# Patient Record
Sex: MALE | Race: BLACK OR AFRICAN AMERICAN | Employment: UNEMPLOYED | URBAN - METROPOLITAN AREA
[De-identification: names, ages, dates, MRNs, and addresses within clinical notes are randomized per-mention and may not be internally consistent; named-entity substitution may affect disease eponyms.]

---
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
## 2024-06-26 ENCOUNTER — OFFICE VISIT (OUTPATIENT)
Age: 19
End: 2024-06-26
Payer: MEDICAID

## 2024-06-26 VITALS — HEIGHT: 67 IN | BODY MASS INDEX: 22.88 KG/M2 | WEIGHT: 145.8 LBS

## 2024-06-26 DIAGNOSIS — G89.29 CHRONIC PAIN OF LEFT THUMB: Primary | ICD-10-CM

## 2024-06-26 DIAGNOSIS — R20.2 LEFT HAND PARESTHESIA: ICD-10-CM

## 2024-06-26 DIAGNOSIS — M79.645 CHRONIC PAIN OF LEFT THUMB: Primary | ICD-10-CM

## 2024-06-26 PROCEDURE — 99203 OFFICE O/P NEW LOW 30 MIN: CPT

## 2024-06-26 PROCEDURE — 73140 X-RAY EXAM OF FINGER(S): CPT

## 2024-06-26 NOTE — PROGRESS NOTES
Mental Status: He is alert and oriented to person, place, and time.   Psychiatric:         Mood and Affect: Mood normal.         Behavior: Behavior normal.          Left hand: Significant tenderness to palpation at the ulnar aspect of the base of the first metacarpal. NTTP distal or proximal to this. ROM is full however there is a significant hesitation with ROM of all digits.  strength WNL. Neurovascularly intact. Neg Powers. No muscle atrophy noted.    NEUROVASCULAR    Examination L R Examination L R   Carpal Comp. -  Pronator Comp. -    Carpal Tinel +  Pronator Tinel -    Phalen's -  Pronator Stress -    Cubital Comp. -  Guyon Comp. -    Cubital Tinel +  Guyon Tinel -    Elbow Hyperflexion -  Adson's -    Spurling's -  SC Comp. -    PCB Median abn -  SC Tinel -    Radial Tinel -  IC Comp. -    Digital Tinel -  IC Tinel -    Radial 2-Pt WNL  Ulnar 2-Pt WNL      Radial Pulse: 2+  Capillary Refill: < 2 sec  Jasbir: Not Performed  Digital Jasbir: Not Performed      Hand:    Examination L Digit(s) R Digit(s)   1st CMC Tenderness +  -    1st CMC Grind -  -    Sylvia Nodes -  -    Heberden Nodes -  -    A1 Pulley Tenderness -  -    Triggering -  -    UCL Instability -  -    RCL Instability -  -    Lateral Stress Pain -  -    Palmar Cords -  -    Tabletop test -  -    Garrod's Pads -  -     Strength       Pinch Strength         ROM: Full        Imaging / Diagnostics:       6/26/2024 2 views of the left thumb reviewed and significant for no acute osseous abnormalities.       Impression:        Diagnosis Orders   1. Chronic pain of left thumb  AMB POC XRAY, FINGER(S), 2+ VIEWS      2. Left hand paresthesia              Plan:     Overall unclear etiology of his thumb base pain at this time. ?Old fracture. Would like him to follow up with Dr. Orr for further management.     EMG ordered given his paraesthesia and + findings on exam    Plan was reviewed with patient, who verbalized agreement and understanding of

## 2024-07-12 NOTE — PROGRESS NOTES
demonstrates preserved joint space without evidence of fracture or dislocation. There is no evidence of patella darryl or avulsion of the tibial tubercle. No malalignment of the patella on sunrise.     MRI 9/2023:  Small area of localized edema. Increase signal at the proximal patellar tendon and adjacent inferior most subchondral bone marrow of the patella.     PHYSICAL EXAMINATION:  Ht 1.702 m (5' 7\")   Wt 69.9 kg (154 lb)   BMI 24.12 kg/m²   Body mass index is 24.12 kg/m².  Wt Readings from Last 3 Encounters:   07/15/24 69.9 kg (154 lb)   06/26/24 66.1 kg (145 lb 12.8 oz)   08/19/19 67.7 kg (149 lb 3.2 oz) (74 %, Z= 0.66)*     * Growth percentiles are based on [facility] (Boys, 2-20 Years) data.       GENERAL: Alert and oriented x3, in no acute distress.  HEENT: Normocephalic, atraumatic.    MSK: Left Knee Exam     Tenderness   The patient is experiencing no tenderness.     Range of Motion   Extension:  0   Flexion:  120     Tests   Joe:  Medial - negative Lateral - negative  Varus: negative Valgus: negative  Drawer:  Anterior - negative     Posterior - negative  Patellar apprehension: negative    Other   Erythema: absent  Scars: absent  Sensation: normal  Pulse: present  Swelling: none           ASSESSMENT and PLAN:    July 15, 2024:  Left knee pain, chronic. Jumper's knee.   MRI taken in September 2023 shows evidence of possible jumper's knee. I could not elicit pain on exam but patient states he has discomfort in full extension at the front and back of his knee. He states that his position of comfort is when is knee is bent. He has some mild ttp and tightness of the posterior aspect of his left leg. No weakness or pain with active/ resisted knee flexion. He does have a good amount of decreased flexibility in his quads and hamstrings. Based on his imaging, presentation, and exam I think his discomfort could be secondary to hamstring tightness/ weakness and patellar tendinitis. He has not tried any

## 2024-07-15 ENCOUNTER — OFFICE VISIT (OUTPATIENT)
Age: 19
End: 2024-07-15
Payer: MEDICAID

## 2024-07-15 VITALS — HEIGHT: 67 IN | BODY MASS INDEX: 24.17 KG/M2 | WEIGHT: 154 LBS

## 2024-07-15 DIAGNOSIS — G89.29 CHRONIC PAIN OF LEFT KNEE: Primary | ICD-10-CM

## 2024-07-15 DIAGNOSIS — M62.89 HAMSTRING TIGHTNESS OF LEFT LOWER EXTREMITY: ICD-10-CM

## 2024-07-15 DIAGNOSIS — M25.562 CHRONIC PAIN OF LEFT KNEE: Primary | ICD-10-CM

## 2024-07-15 DIAGNOSIS — M76.52 PATELLAR TENDINITIS OF LEFT KNEE: ICD-10-CM

## 2024-07-15 PROCEDURE — 73564 X-RAY EXAM KNEE 4 OR MORE: CPT

## 2024-07-15 PROCEDURE — 99214 OFFICE O/P EST MOD 30 MIN: CPT

## 2024-07-15 RX ORDER — NAPROXEN 250 MG/1
250 TABLET ORAL 2 TIMES DAILY WITH MEALS
Qty: 30 TABLET | Refills: 1 | Status: SHIPPED | OUTPATIENT
Start: 2024-07-15 | End: 2024-09-13

## 2024-07-24 ENCOUNTER — OFFICE VISIT (OUTPATIENT)
Age: 19
End: 2024-07-24
Payer: MEDICAID

## 2024-07-24 VITALS — BODY MASS INDEX: 22.76 KG/M2 | HEIGHT: 67 IN | WEIGHT: 145 LBS

## 2024-07-24 DIAGNOSIS — M25.512 CHRONIC LEFT SHOULDER PAIN: Primary | ICD-10-CM

## 2024-07-24 DIAGNOSIS — G89.29 CHRONIC LEFT SHOULDER PAIN: Primary | ICD-10-CM

## 2024-07-24 PROCEDURE — 73030 X-RAY EXAM OF SHOULDER: CPT | Performed by: ORTHOPAEDIC SURGERY

## 2024-07-24 PROCEDURE — 99214 OFFICE O/P EST MOD 30 MIN: CPT | Performed by: ORTHOPAEDIC SURGERY

## 2024-07-24 NOTE — PROGRESS NOTES
[facility] AMBULATORY OFFICE NOTE      Patient: Hasmukh Santiago                MRN: [id number]       SSN: [id number]  YOB: 2005        AGE: 18 y.o.        SEX: male  Body mass index is 22.71 kg/m².    PCP: Cholo Gutierrez APRN - NP  07/24/24    CHIEF COMPLAINT: Left shoulder pain    HPI: Hasmukh Santiago is a 18 y.o. male patient who complains of left shoulder pain for over a year.  He injured his shoulder over 1 year ago when he was involved in a tackle playing football.  He then got up tackled again and felt immediate pain.  He was seen in another orthopedic office.  He was sent for an MRI arthrogram this was done last year.  He never followed up afterwards due to social issues.  He continues to complain of pain in the anterior shoulder and difficulty with shoulder use.  He also complains of some pain down the arm at the time of the injury.    No past medical history on file.    Family History   Problem Relation Age of Onset    Hypertension Maternal Grandmother     Psychiatric Disorder Other        Current Outpatient Medications   Medication Sig Dispense Refill    naproxen (NAPROSYN) 250 MG tablet Take 1 tablet by mouth 2 times daily (with meals) 30 tablet 1     No current facility-administered medications for this visit.       No Known Allergies    No past surgical history on file.    Social History     Socioeconomic History    Marital status: Single     Spouse name: Not on file    Number of children: Not on file    Years of education: Not on file    Highest education level: Not on file   Occupational History    Not on file   Tobacco Use    Smoking status: Never    Smokeless tobacco: Never   Substance and Sexual Activity    Alcohol use: No    Drug use: No    Sexual activity: Not on file   Other Topics Concern    Not on file   Social History Narrative    Not on file     Social Determinants of Health     Financial Resource Strain: Not on file   Food Insecurity: Not on

## 2024-09-11 ENCOUNTER — CLINICAL DOCUMENTATION (OUTPATIENT)
Age: 19
End: 2024-09-11